# Patient Record
Sex: MALE | Race: BLACK OR AFRICAN AMERICAN | NOT HISPANIC OR LATINO | URBAN - METROPOLITAN AREA
[De-identification: names, ages, dates, MRNs, and addresses within clinical notes are randomized per-mention and may not be internally consistent; named-entity substitution may affect disease eponyms.]

---

## 2018-07-05 ENCOUNTER — EMERGENCY (EMERGENCY)
Facility: HOSPITAL | Age: 58
LOS: 0 days | Discharge: HOME | End: 2018-07-05
Admitting: PHYSICIAN ASSISTANT

## 2018-07-05 VITALS
OXYGEN SATURATION: 97 % | SYSTOLIC BLOOD PRESSURE: 134 MMHG | HEART RATE: 87 BPM | DIASTOLIC BLOOD PRESSURE: 77 MMHG | TEMPERATURE: 96 F | RESPIRATION RATE: 18 BRPM

## 2018-07-05 DIAGNOSIS — K02.9 DENTAL CARIES, UNSPECIFIED: ICD-10-CM

## 2018-07-05 DIAGNOSIS — K08.89 OTHER SPECIFIED DISORDERS OF TEETH AND SUPPORTING STRUCTURES: ICD-10-CM

## 2018-07-05 NOTE — CONSULT NOTE ADULT - SUBJECTIVE AND OBJECTIVE BOX
Patient is a 57y old  Male who presents with a chief complaint of pain coming from teeth #9, #10, and #11.     HPI:  Diabetes      PAST MEDICAL & SURGICAL HISTORY:    (   ) heart valve replacement  (   ) joint replacement  (   ) pregnancy    MEDICATIONS  (STANDING): Metformin    MEDICATIONS  (PRN):      REVIEW OF SYSTEMS      General:	    Skin/Breast:  	  Ophthalmologic:  	  ENMT:	    Respiratory and Thorax:  	  Cardiovascular:	    Gastrointestinal:	    Genitourinary:	    Musculoskeletal:	    Neurological:	    Psychiatric:	    Hematology/Lymphatics:	    Endocrine:	    Allergic/Immunologic:	    Allergies      Intolerances        FAMILY HISTORY:      *SOCIAL HISTORY: (   ) Tobacco; (   ) ETOH    Vital Signs Last 24 Hrs  T(C): 35.7 (05 Jul 2018 15:36), Max: 35.7 (05 Jul 2018 15:36)  T(F): 96.2 (05 Jul 2018 15:36), Max: 96.2 (05 Jul 2018 15:36)  HR: 87 (05 Jul 2018 15:36) (87 - 87)  BP: 134/77 (05 Jul 2018 15:36) (134/77 - 134/77)       120/78 (05 Jul 2018)  BP(mean): --  RR: 18 (05 Jul 2018 15:36) (18 - 18)  SpO2: 97% (05 Jul 2018 15:36) (97% - 97%)    LABS:                  Last Dental Visit: <<  >>    EOE:  TMJ ( -  ) clicks                    ( -  ) pops                     ( -  ) crepitus             Mandible <<FROM>>             Facial bones and MOM <<grossly intact>>             ( -  ) trismus             ( -  ) lymphadenopathy             ( -  ) swelling             ( -  ) asymmetry             ( -  ) palpation             ( -  ) dyspnea             ( -  ) dysphagia             ( -  ) loss of consciousness    IOE:  <<permanent>> dentition            <<multiple carious teeth>>             <<multiple missing teeth>>             Dentition Present <<  >>                     Mobility << + >>                     Caries << + >>                hard/soft palate:  (   ) palatal torus, <<No pathology noted>>            tongue/FOM <<No pathology noted>>            labial/buccal mucosa <<No pathology noted>>           ( +  ) percussion           (  + ) palpation           ( -  ) swelling            ( -  ) abscess           ( -  ) sinus tract    *DENTAL RADIOGRAPHS: 1 periapical radiograph of teeth #9-11    RADIOLOGY & ADDITIONAL STUDIES:    *ASSESSMENT: teeth 9-11 require extraction due to lack of alveolar bone support (severe chronic periodontitis)    *PLAN: non-surgical extraction of teeth 9-11    PROCEDURE:   Verbal and written consent given. Risks and benefits reviewed as per OS sheet dated 7/13/00. Side/Site consents obtained.  Anesthesia: << 2 carpules of 4% septocaine via local infiltrations    >>   Treatment: <<   teeth #9, 10, 11 extracted non-surgically. Sockets curetted. Hemostasis achieved. Post-op radiograph taken (negative) >>     RECOMMENDATIONS:  1) << take medications as prescribed. Post op instructions reviewed.   >>  2) Dental F/U with outpatient dentist for comprehensive dental care.   3) If any difficulty swallowing/breathing, fever occur, return to ER.     Resident Name, pager #  Radha Dhaliwal Patient is a 57y old  Male who presents with a chief complaint of pain coming from teeth #9, #10, and #11.     HPI:  Diabetes      PAST MEDICAL & SURGICAL HISTORY:    (   ) heart valve replacement  (   ) joint replacement  (   ) pregnancy    MEDICATIONS  (STANDING): Metformin    MEDICATIONS  (PRN):      REVIEW OF SYSTEMS      General:	    Skin/Breast:  	  Ophthalmologic:  	  ENMT:	    Respiratory and Thorax:  	  Cardiovascular:	    Gastrointestinal:	    Genitourinary:	    Musculoskeletal:	    Neurological:	    Psychiatric:	    Hematology/Lymphatics:	    Endocrine:	    Allergic/Immunologic:	    Allergies      Intolerances        FAMILY HISTORY:      *SOCIAL HISTORY: (   ) Tobacco; (   ) ETOH    Vital Signs Last 24 Hrs  T(C): 35.7 (05 Jul 2018 15:36), Max: 35.7 (05 Jul 2018 15:36)  T(F): 96.2 (05 Jul 2018 15:36), Max: 96.2 (05 Jul 2018 15:36)  HR: 87 (05 Jul 2018 15:36) (87 - 87)  BP: 134/77 (05 Jul 2018 15:36) (134/77 - 134/77)       120/78 (05 Jul 2018)  BP(mean): --  RR: 18 (05 Jul 2018 15:36) (18 - 18)  SpO2: 97% (05 Jul 2018 15:36) (97% - 97%)    LABS:                  Last Dental Visit: <<  >>    EOE:  TMJ ( -  ) clicks                    ( -  ) pops                     ( -  ) crepitus             Mandible <<FROM>>             Facial bones and MOM <<grossly intact>>             ( -  ) trismus             ( -  ) lymphadenopathy             ( -  ) swelling             ( -  ) asymmetry             ( -  ) palpation             ( -  ) dyspnea             ( -  ) dysphagia             ( -  ) loss of consciousness    IOE:  <<permanent>> dentition            <<multiple carious teeth>>             <<multiple missing teeth>>             Dentition Present <<  >>                     Mobility << + >>                     Caries << + >>                hard/soft palate:  (   ) palatal torus, <<No pathology noted>>            tongue/FOM <<No pathology noted>>            labial/buccal mucosa <<No pathology noted>>           ( +  ) percussion           (  + ) palpation           ( -  ) swelling            ( -  ) abscess           ( -  ) sinus tract    *DENTAL RADIOGRAPHS: 1 periapical radiograph of teeth #9-11    RADIOLOGY & ADDITIONAL STUDIES:    *ASSESSMENT: teeth 9-11 require extraction due to lack of alveolar bone support (severe chronic periodontitis)    *PLAN: non-surgical extraction of teeth 9-11    PROCEDURE:   Verbal and written consent given. Risks and benefits reviewed as per OS sheet dated 7/13/00. Side/Site consents obtained.  Anesthesia: << 2 carpules of 4% septocaine via local infiltrations    >>   Treatment: <<   teeth #9, 10, 11 extracted non-surgically. Sockets curetted. Hemostasis achieved. Post-op radiograph taken (negative) >>     Prescribed amoxicillin 500 mg (x21) every 8 hours and ibuprofen 600 mg (x20)    RECOMMENDATIONS:  1) << take medications as prescribed. Post op instructions reviewed.   >>  2) Dental F/U with outpatient dentist for comprehensive dental care.   3) If any difficulty swallowing/breathing, fever occur, return to ER.     Resident Name, pager #  Radha Dhaliwal

## 2018-07-05 NOTE — ED PROVIDER NOTE - OBJECTIVE STATEMENT
Pt is a 56 yo M c/o dental pain for several weeks. Sts he started having dental work done at [facility] and had several teeth extracted but recently moved to [place] and now needs to continue having work done here. Pt went to his PMD who started him on abx for 10 days, no fever or chills.

## 2018-07-05 NOTE — ED PROVIDER NOTE - PHYSICAL EXAMINATION
CONST: Well appearing in NAD  EYES: PERRL, EOMI, Sclera and conjunctiva clear.   ENT: Tooth #9 decayed and tender. Missing many upper teeth, gums slightly inflamed. No abscess noted. No nasal discharge. TM's clear B/L without drainage. Oropharynx normal appearing, no erythema or exudates. Uvula midline.  NECK: Non-tender, no meningeal signs  CARD: Normal S1 S2; Normal rate and rhythm  RESP: Equal BS B/L, No wheezes, rhonchi or rales. No distress

## 2018-07-05 NOTE — ED PROVIDER NOTE - NS ED ROS FT
CONST: No fever, chills or bodyaches  EYES: No pain, redness, drainage or visual changes.  ENT: +Dental pain; No ear pain or discharge, nasal discharge or congestion. No sore throat  CARD: No chest pain, palpitations  RESP: No SOB, cough, hemoptysis. No hx of asthma or COPD  GI: No abdominal pain, N/V/D  MS: No joint pain, back pain or extremity pain/injury  SKIN: No rashes  NEURO: No headache, dizziness, paresthesias or LOC

## 2021-08-28 ENCOUNTER — EMERGENCY (EMERGENCY)
Facility: HOSPITAL | Age: 61
LOS: 0 days | Discharge: HOME | End: 2021-08-28
Attending: EMERGENCY MEDICINE | Admitting: EMERGENCY MEDICINE
Payer: MEDICAID

## 2021-08-28 VITALS
DIASTOLIC BLOOD PRESSURE: 81 MMHG | WEIGHT: 192.02 LBS | HEART RATE: 96 BPM | RESPIRATION RATE: 16 BRPM | SYSTOLIC BLOOD PRESSURE: 150 MMHG | OXYGEN SATURATION: 99 % | TEMPERATURE: 97 F

## 2021-08-28 DIAGNOSIS — F17.200 NICOTINE DEPENDENCE, UNSPECIFIED, UNCOMPLICATED: ICD-10-CM

## 2021-08-28 DIAGNOSIS — E11.9 TYPE 2 DIABETES MELLITUS WITHOUT COMPLICATIONS: ICD-10-CM

## 2021-08-28 DIAGNOSIS — M79.672 PAIN IN LEFT FOOT: ICD-10-CM

## 2021-08-28 DIAGNOSIS — M79.671 PAIN IN RIGHT FOOT: ICD-10-CM

## 2021-08-28 PROCEDURE — 99283 EMERGENCY DEPT VISIT LOW MDM: CPT

## 2021-08-28 RX ORDER — ACETAMINOPHEN 500 MG
975 TABLET ORAL ONCE
Refills: 0 | Status: COMPLETED | OUTPATIENT
Start: 2021-08-28 | End: 2021-08-28

## 2021-08-28 RX ADMIN — Medication 975 MILLIGRAM(S): at 18:24

## 2021-08-28 NOTE — ED PROVIDER NOTE - NS ED ROS FT
Constitutional: no fever, chills, no recent weight loss, change in appetite or malaise  Eyes: no redness/discharge/pain/vision changes  ENT: no rhinorrhea/ear pain/sore throat  Cardiac: No chest pain, SOB or edema.  Respiratory: No cough or respiratory distress  GI: No nausea, vomiting, diarrhea or abdominal pain.  : No dysuria, frequency, urgency or hematuria  MS: + b/l foot pain. no loss of ROM, no weakness  Neuro: No headache or weakness. No LOC.  Skin: No skin rash.  Endocrine: + hx of diabetes.  Except as documented in the HPI, all other systems are negative.

## 2021-08-28 NOTE — ED ADULT TRIAGE NOTE - CHIEF COMPLAINT QUOTE
Pt c/o b/l foot & ankle pain that began while in [place] a few days ago; pt saw a doctor there who told him he was dehydrated.

## 2021-08-28 NOTE — ED PROVIDER NOTE - CARE PROVIDER_API CALL
Pepe Hamilton  [address] [address]  Phone: [phone]  Fax: [phone]  Follow Up Time:     Lázaro Hoang  ENDOCRINOLOGY/METAB/DIABETES  [address] [place] [address]  Phone: [phone]  Fax: [phone]  Follow Up Time:

## 2021-08-28 NOTE — ED PROVIDER NOTE - PHYSICAL EXAMINATION
CONSTITUTIONAL: Well-appearing; well-nourished; in no apparent distress.   EYES: PERRL; EOM intact.   ENT: normal nose; no rhinorrhea; normal pharynx with no tonsillar hypertrophy.   NECK: Supple; non-tender; no cervical lymphadenopathy.   CARDIOVASCULAR: Normal S1, S2; no murmurs, rubs, or gallops.   RESPIRATORY: Normal chest excursion with respiration; breath sounds clear and equal bilaterally; no wheezes, rhonchi, or rales.  GI/: Normal bowel sounds; non-distended; non-tender; no palpable organomegaly.   MS: No evidence of trauma or deformity. Non-tender to palpation. No scoliosis. No CVA tenderness. Normal ROM in all four extremities; non-tender to palpation; distal pulses are normal.   SKIN: Normal for age and race; warm; dry; good turgor; no apparent lesions or exudate.   NEURO/PSYCH: A & O x 4; grossly unremarkable. mood and manner are appropriate. Grooming and personal hygiene are appropriate. No apparent thoughts of harm to self or others.

## 2021-08-28 NOTE — ED PROVIDER NOTE - OBJECTIVE STATEMENT
60 year old M with hx of DM (non compliant with metformin x years), presenting to ED requesting pmd to follow up with so he can resume medical care.  Pt also c/o b/l foot pain which was evaluated x 1 week ago in [place]. Sts he had neg xrays and was prescribed naproxen and re prescribed metformin. No other acute complaints at this time.

## 2021-08-28 NOTE — ED PROVIDER NOTE - ATTENDING CONTRIBUTION TO CARE
59 yo male PMH DM for several years, stopped taking meds for about a year, no resumed is here asking to be plugged into the medical system, would like to get  PMD so he can resume regular medical care.  No acute complaints, but reports b/l foot pain for some time, he had it evaluated in an ED in [place], no acute pathology was found and he was prescribed pain meds.  Patient appears well, sitting on a chair in NAD, PERRL, mmm, nml work of breathing, speaking full sentences, no pitting leg edema or calf ttp, awake and alert x3.  Contact info for PMD given, Patient was advised to return to ER if any acute health problems if unable to see PCP. FS is 300 in ED, patient is due to take Metformin 500 mg, wants to take it when he gets home. He verbalized understanding and is amenable with the plan.

## 2021-08-28 NOTE — ED ADULT NURSE NOTE - OBJECTIVE STATEMENT
Pt is a 61 yo male c/o b/l foot & ankle pain that began while in [place] a few days ago; pt saw a doctor there who told him he was dehydrated. worse with activity, described as throbbing. sts wants to find pmd in [place].

## 2021-08-28 NOTE — ED PROVIDER NOTE - CLINICAL SUMMARY MEDICAL DECISION MAKING FREE TEXT BOX
61 yo male PMH DM for several years, stopped taking meds for about a year, no resumed is here asking to be plugged into the medical system, would like to get  PMD so he can resume regular medical care.  No acute complaints, but reports b/l foot pain for some time, he had it evaluated in an ED in [place], no acute pathology was found and he was prescribed pain meds.  Patient appears well, sitting on a chair in NAD, PERRL, mmm, nml work of breathing, speaking full sentences, no pitting leg edema or calf ttp, awake and alert x3.  Contact info for PMD given, Patient was advised to return to ER if any acute health problems if unable to see PCP. FS is 300 in ED, patient is due to take Metformin 500 mg, wants to take it when he gets home. He verbalized understanding and is amenable with the plan.

## 2021-08-28 NOTE — ED ADULT NURSE NOTE - CHIEF COMPLAINT QUOTE
Pt c/o b/l foot & ankle pain that began while in [place] a few days ago; pt saw a doctor there who told him he was dehydrated.

## 2021-08-28 NOTE — ED ADULT NURSE NOTE - NSIMPLEMENTINTERV_GEN_ALL_ED
Implemented All Universal Safety Interventions:  Stephan to call system. Call bell, personal items and telephone within reach. Instruct patient to call for assistance. Room bathroom lighting operational. Non-slip footwear when patient is off stretcher. Physically safe environment: no spills, clutter or unnecessary equipment. Stretcher in lowest position, wheels locked, appropriate side rails in place.

## 2021-08-28 NOTE — ED PROVIDER NOTE - PATIENT PORTAL LINK FT
You can access the FollowMyHealth Patient Portal offered by [facility] by registering at the following website: [email or web address]. By joining [facility]’s FollowMyHealth portal, you will also be able to view your health information using other applications (apps) compatible with our system.

## 2023-05-08 NOTE — ED PROVIDER NOTE - NS ED ATTENDING STATEMENT MOD
Attending with Benzoyl Peroxide Pregnancy And Lactation Text: This medication is Pregnancy Category C. It is unknown if benzoyl peroxide is excreted in breast milk.

## 2023-12-18 NOTE — ED PROVIDER NOTE - PRINCIPAL DIAGNOSIS
Mental Health and Collaborative Care Psychiatry Service Rooming Note, Pt got a appendectomy- pt reports bp has been high ever since        Most pressing mental health concern at this time: Follow up      Any new physical health conditions or diagnoses affecting you that we should be aware of: none      Side effects related to medications patient would like to discuss with the provider:  No      Are you taking your medications as prescribed?  yes  If not, why?       Do you need refills of any of the medications?  Yes, will  discuss with provider   If so, which ones?       Are you taking any recreational substances? Pt denies      Is there any chance you are pregnant? no  Do you use birth control?       Provider notified  N/A      Care team has reviewed attendance agreement with patient. Patient advised that two failed appointments within 6 months may lead to termination of current episode of care.        Geovanna Santo MA  December 18, 2023  10:26 AM         Bilateral foot pain